# Patient Record
Sex: MALE | Race: OTHER | HISPANIC OR LATINO
[De-identification: names, ages, dates, MRNs, and addresses within clinical notes are randomized per-mention and may not be internally consistent; named-entity substitution may affect disease eponyms.]

---

## 2021-08-27 PROBLEM — Z00.00 ENCOUNTER FOR PREVENTIVE HEALTH EXAMINATION: Status: ACTIVE | Noted: 2021-08-27

## 2021-10-06 ENCOUNTER — APPOINTMENT (OUTPATIENT)
Dept: NEUROSURGERY | Facility: CLINIC | Age: 66
End: 2021-10-06
Payer: COMMERCIAL

## 2021-10-06 VITALS
SYSTOLIC BLOOD PRESSURE: 136 MMHG | TEMPERATURE: 97.6 F | OXYGEN SATURATION: 94 % | BODY MASS INDEX: 26.21 KG/M2 | HEIGHT: 67 IN | WEIGHT: 167 LBS | HEART RATE: 66 BPM | DIASTOLIC BLOOD PRESSURE: 81 MMHG

## 2021-10-06 PROCEDURE — 99203 OFFICE O/P NEW LOW 30 MIN: CPT

## 2021-10-10 NOTE — HISTORY OF PRESENT ILLNESS
[> 3 months] : more  than 3 months [de-identified] : He has been having low back and leg pain for about 10 yrs. He also has left knee pain. He has numbness  of the left leg all toes except the big toe. He is only able to walk about 2 blocks.  Pain worse with walking, stairs and lifting objects. He has had PT but did not help. He had two  epidural injections with no help. He can stand for about 20 mins. He has no problem working because he needs to change positions often. The pain severity is 8 out 10. The only way with no pain is lying down. He has no bowel or bladder incontinence.  He had a episode of numbness of genitals but it went away.

## 2021-10-10 NOTE — ASSESSMENT
[FreeTextEntry1] : He has had more than 10 years of low back and leg pain. Over the last six months he has had Physical therapy with no long lasting help. He has done activity modification and he has had two epidural injections with no significant help. \par His MRI shows a grade one spondylolisthesis at L4-5 with severe stenosis and facet arthropathy. At L3-4 he has moderate stenosis.\par His best option is surgery since he has failed conservative therapy. I would do a L3-5 minimal invasive laminectomy and fusion.

## 2021-10-10 NOTE — PHYSICAL EXAM
[L-Spine ___ (level)] : ~Ulevel [unfilled] lumbar spine [Restricted] : was restricted [No Deficits] : no sensory deficits [Pain / Temp Decrease Lateral Leg & Dorsum Of Foot Left Only] : L5 sensory impairment [Pain / Temp Decrease Sole Of Foot & Posterior Leg Bilateral] : S1 sensory impairment [5] : 5/5 Ankle Plantar Flexion (S1) [Straight-Leg Raise Test - Left] : straight leg raise of the left leg was negative [Straight-Leg Raise Test - Right] : straight leg raise  of the right leg was negative [Deformity] : no deformity [Pain / Temp Decrease Knee And Medial Leg (L4) - Left Only] : no L4 sensory impairment

## 2021-10-21 ENCOUNTER — APPOINTMENT (OUTPATIENT)
Dept: NEUROSURGERY | Facility: CLINIC | Age: 66
End: 2021-10-21
Payer: COMMERCIAL

## 2021-10-21 VITALS
SYSTOLIC BLOOD PRESSURE: 116 MMHG | WEIGHT: 167 LBS | OXYGEN SATURATION: 98 % | TEMPERATURE: 97.6 F | BODY MASS INDEX: 26.21 KG/M2 | HEIGHT: 67 IN | DIASTOLIC BLOOD PRESSURE: 71 MMHG | HEART RATE: 66 BPM

## 2021-10-21 DIAGNOSIS — M48.00 SPINAL STENOSIS, SITE UNSPECIFIED: ICD-10-CM

## 2021-10-21 DIAGNOSIS — M54.9 DORSALGIA, UNSPECIFIED: ICD-10-CM

## 2021-10-21 PROCEDURE — 99214 OFFICE O/P EST MOD 30 MIN: CPT

## 2021-11-04 ENCOUNTER — APPOINTMENT (OUTPATIENT)
Dept: NEUROSURGERY | Facility: CLINIC | Age: 66
End: 2021-11-04

## 2021-11-16 NOTE — ASSESSMENT
[FreeTextEntry1] : SHe has severe stenosis at L4-5 and lateral recess at L3-4. His xray shows a grade one spondylolisthesis. He has neurogenic claudication and he has had PT and injections with no help. It think his best option is a L3-5 fusion and instrumentation. He wants to do it early next year. He will call us if he wants to proceed.

## 2021-11-16 NOTE — HISTORY OF PRESENT ILLNESS
[> 3 months] : more  than 3 months [de-identified] : 10/21/21\par He is here for follow up. After the last visit he was sent for xarys to rule out instability. The symptoms have not changed. He has had no fevers, chills, cough or night sweats\par \par \par 10/06/21\par He has been having low back and leg pain for about 10 yrs. He also has left knee pain. He has numbness  of the left leg all toes except the big toe. He is only able to walk about 2 blocks.  Pain worse with walking, stairs and lifting objects. He has had PT but did not help. He had two  epidural injections with no help. He can stand for about 20 mins. He has no problem working because he needs to change positions often. The pain severity is 8 out 10. The only way with no pain is lying down. He has no bowel or bladder incontinence.  He had a episode of numbness of genitals but it went away.

## 2021-12-10 RX ORDER — IBUPROFEN 800 MG/1
800 TABLET, FILM COATED ORAL 3 TIMES DAILY
Qty: 90 | Refills: 0 | Status: ACTIVE | COMMUNITY
Start: 2021-12-10 | End: 1900-01-01